# Patient Record
Sex: FEMALE | URBAN - NONMETROPOLITAN AREA
[De-identification: names, ages, dates, MRNs, and addresses within clinical notes are randomized per-mention and may not be internally consistent; named-entity substitution may affect disease eponyms.]

---

## 2023-05-09 ENCOUNTER — OFFICE VISIT (OUTPATIENT)
Dept: URBAN - NONMETROPOLITAN AREA CLINIC 2 | Facility: CLINIC | Age: 68
End: 2023-05-09

## 2023-05-11 ENCOUNTER — OFFICE VISIT (OUTPATIENT)
Dept: URBAN - NONMETROPOLITAN AREA CLINIC 2 | Facility: CLINIC | Age: 68
End: 2023-05-11
Payer: MEDICARE

## 2023-05-11 ENCOUNTER — DASHBOARD ENCOUNTERS (OUTPATIENT)
Age: 68
End: 2023-05-11

## 2023-05-11 VITALS — WEIGHT: 130 LBS | HEART RATE: 81 BPM | SYSTOLIC BLOOD PRESSURE: 133 MMHG | DIASTOLIC BLOOD PRESSURE: 85 MMHG

## 2023-05-11 DIAGNOSIS — R11.2 ACUTE NAUSEA WITH NONBILIOUS VOMITING: ICD-10-CM

## 2023-05-11 DIAGNOSIS — K59.09 CHANGE IN BOWEL MOVEMENTS INTERMITTENT CONSTIPATION. URGENCY IN THE MORNING.: ICD-10-CM

## 2023-05-11 DIAGNOSIS — R10.13 EPIGASTRIC PAIN: ICD-10-CM

## 2023-05-11 PROCEDURE — 99213 OFFICE O/P EST LOW 20 MIN: CPT

## 2023-05-11 NOTE — HPI-TODAY'S VISIT:
5/11/2023 Ms. Mcfadden returns to clinic for evaluation of abdominal pain.  She also endorses a sensation of reflux that she felt while laying face down on the table at her chiropractor's office.  She states her primary care provider sent her to another GI doctor and she was placed on Prilosec 30 mg daily for 3 months.  She states this has somewhat improved her abdominal pain.  She feels it mostly in the epigastric region and across her upper abdomen.  She did have a right upper quadrant ultrasound that was normal that showed some possible gallbladder sludge.  She states the pain is not always clear is intermittent but possibly constant as she is not always mentally focusing on it.  She states it is like a heaviness.  She does endorse some occasional nausea.  She denies odynophagia or dysphagia.  There are no reports of melena or blood per rectum.  None of her symptoms are exacerbated by eating a meal and she has not noticed any dietary trigger.  She continues with a good appetite.  She did have 1 episode of emesis earlier this year in March.  She has a history of diverticulosis and occasional constipation with most recent colonoscopy completed with Dr. Samuel with removal of 2 polyps in 2020.  She states she has a sister with a history of colon cancer. SP

## 2023-05-12 LAB
A/G RATIO: 1.5
ABSOLUTE BASOPHILS: 41
ABSOLUTE EOSINOPHILS: 32
ABSOLUTE LYMPHOCYTES: 1636
ABSOLUTE MONOCYTES: 365
ABSOLUTE NEUTROPHILS: 6026
ALBUMIN: 4.4
ALKALINE PHOSPHATASE: 41
ALT (SGPT): 10
AST (SGOT): 15
BASOPHILS: 0.5
BILIRUBIN, TOTAL: 0.4
BUN/CREATININE RATIO: (no result)
BUN: 11
CALCIUM: 9.9
CARBON DIOXIDE, TOTAL: 26
CHLORIDE: 104
CREATININE: 0.7
EGFR: 93
EOSINOPHILS: 0.4
GLOBULIN, TOTAL: 2.9
GLUCOSE: 101
HEMATOCRIT: 39.6
HEMOGLOBIN: 13.2
IMMUNOGLOBULIN A: 300
INTERPRETATION: (no result)
LYMPHOCYTES: 20.2
MCH: 30.3
MCHC: 33.3
MCV: 90.8
MONOCYTES: 4.5
MPV: 8.9
NEUTROPHILS: 74.4
PLATELET COUNT: 276
POTASSIUM: 4.4
PROTEIN, TOTAL: 7.3
RDW: 12.8
RED BLOOD CELL COUNT: 4.36
SODIUM: 141
TISSUE TRANSGLUTAMINASE AB, IGA: <1
WHITE BLOOD CELL COUNT: 8.1

## 2023-05-15 ENCOUNTER — TELEPHONE ENCOUNTER (OUTPATIENT)
Dept: URBAN - NONMETROPOLITAN AREA CLINIC 2 | Facility: CLINIC | Age: 68
End: 2023-05-15

## 2023-05-15 PROBLEM — 14760008: Status: ACTIVE | Noted: 2023-05-11

## 2023-05-15 PROBLEM — 79922009: Status: ACTIVE | Noted: 2023-05-11

## 2023-05-15 PROBLEM — 16932000: Status: ACTIVE | Noted: 2023-05-11

## 2023-05-19 ENCOUNTER — TELEPHONE ENCOUNTER (OUTPATIENT)
Dept: URBAN - NONMETROPOLITAN AREA CLINIC 2 | Facility: CLINIC | Age: 68
End: 2023-05-19

## 2023-05-19 LAB — HELICOBACTER PYLORI AG, EIA, STOOL: (no result)

## 2023-08-21 ENCOUNTER — OFFICE VISIT (OUTPATIENT)
Dept: URBAN - NONMETROPOLITAN AREA CLINIC 2 | Facility: CLINIC | Age: 68
End: 2023-08-21